# Patient Record
Sex: MALE | Race: WHITE | Employment: FULL TIME | URBAN - METROPOLITAN AREA
[De-identification: names, ages, dates, MRNs, and addresses within clinical notes are randomized per-mention and may not be internally consistent; named-entity substitution may affect disease eponyms.]

---

## 2022-04-28 ENCOUNTER — HOSPITAL ENCOUNTER (EMERGENCY)
Age: 32
Discharge: HOME | End: 2022-04-28
Attending: EMERGENCY MEDICINE
Payer: COMMERCIAL

## 2022-04-28 VITALS
OXYGEN SATURATION: 99 % | DIASTOLIC BLOOD PRESSURE: 101 MMHG | HEIGHT: 76 IN | HEART RATE: 82 BPM | WEIGHT: 285 LBS | BODY MASS INDEX: 34.7 KG/M2 | RESPIRATION RATE: 18 BRPM | TEMPERATURE: 97.7 F | SYSTOLIC BLOOD PRESSURE: 138 MMHG

## 2022-04-28 DIAGNOSIS — T15.91XA FOREIGN BODY OF RIGHT EYE, INITIAL ENCOUNTER: Primary | ICD-10-CM

## 2022-04-28 DIAGNOSIS — S05.01XA ABRASION OF RIGHT CORNEA, INITIAL ENCOUNTER: ICD-10-CM

## 2022-04-28 PROCEDURE — 99283 EMERGENCY DEPT VISIT LOW MDM: CPT

## 2022-04-28 PROCEDURE — 6370000000 HC RX 637 (ALT 250 FOR IP): Performed by: PHYSICIAN ASSISTANT

## 2022-04-28 RX ORDER — ERYTHROMYCIN 5 MG/G
OINTMENT OPHTHALMIC
Qty: 3.5 G | Refills: 0 | Status: SHIPPED | OUTPATIENT
Start: 2022-04-28 | End: 2022-05-08

## 2022-04-28 RX ORDER — TETRACAINE HYDROCHLORIDE 5 MG/ML
SOLUTION OPHTHALMIC
Status: DISCONTINUED
Start: 2022-04-28 | End: 2022-04-28 | Stop reason: HOSPADM

## 2022-04-28 RX ORDER — TETRACAINE HYDROCHLORIDE 5 MG/ML
1 SOLUTION OPHTHALMIC ONCE
Status: COMPLETED | OUTPATIENT
Start: 2022-04-28 | End: 2022-04-28

## 2022-04-28 RX ADMIN — TETRACAINE HYDROCHLORIDE 1 DROP: 5 SOLUTION OPHTHALMIC at 13:50

## 2022-04-28 ASSESSMENT — PAIN DESCRIPTION - LOCATION: LOCATION: EYE

## 2022-04-28 ASSESSMENT — VISUAL ACUITY
OU: 20/25
OS: 20/30
OD: 20/40

## 2022-04-28 ASSESSMENT — PAIN DESCRIPTION - ORIENTATION: ORIENTATION: RIGHT

## 2022-04-28 ASSESSMENT — PAIN SCALES - GENERAL: PAINLEVEL_OUTOF10: 3

## 2022-04-28 ASSESSMENT — PAIN DESCRIPTION - FREQUENCY: FREQUENCY: CONTINUOUS

## 2022-04-28 ASSESSMENT — PAIN DESCRIPTION - DESCRIPTORS: DESCRIPTORS: THROBBING

## 2022-04-28 ASSESSMENT — PAIN DESCRIPTION - PAIN TYPE: TYPE: ACUTE PAIN

## 2022-04-28 NOTE — ED NOTES
2nd Liter of Normal Saline flushed to right eye. Patient notes it \"feels better. \"     Rajesh Peñaloza RN  04/28/22 6733

## 2022-04-28 NOTE — ED NOTES
Patient to the ER with c/c of right injury while at work today. Patient was spreading mortar on a wall and when laying it on the block, the mortar splashed back at face and went under his safety glasses into his right eye. Patient went to urgent care-[facility] as instructed by cortes and they provided drops to eye and told him to go to ER for flushing of eye. Patient is a/o x 3, patent airway, speaking clearly in complete sentences. Patient denies any CP or dyspnea. Lungs are clear and equal bilaterally to auscultation, HT are S/R, A=R. Abdomen is soft, non-tender. No NVD in last 24 hours. Normal Bowel and Bladder habits. Patient has strong PMS x 4. No peripheral edema is appreciated. Patient was ambulatory into the ER today without distress.       Mavis Mcintyre RN  04/28/22 6750

## 2022-04-28 NOTE — ED PROVIDER NOTES
[id number] [facility] ED  [address] [facility] [address]  Phone: [phone]  Fax: [phone]        Pt Name: Jaclynn Cooks  MRN: [id number]  [place] 1990  Date of evaluation: 4/28/22    CHIEFCOMPLAINT       Chief Complaint   Patient presents with    Eye Problem     mortar in right eye       HISTORY OF PRESENT ILLNESS (Location/Symptom, Timing/Onset, Context/Setting, Quality, Duration, Modifying Factors, Severity)      Jaclynn Cooks is a 34 y.o. male with no pertinent PMH who presents to the ED via private auto with eye pain. Patient states that just prior to arrival he was mixing his mortar on the board and a piece that splattered up and got underneath his safety goggles and into his right eye. He reports immediate onset of pain and says he threw off all of his stuff and ran to his truck and flushed out with a bottle of water. Patient reports of persistent pain and irritation to the right eye as well as blurry vision. Denies any exacerbating or alleviating factors. He does not wear contacts. Notes that the more the used did have an accelerant and is slightly alkaline and so was concerned that maybe there was a chemical injury. Denies any vision loss, fever, chills, taking any medications for pain, or any other concerns at this time. PAST MEDICAL / SURGICAL / SOCIAL / FAMILY HISTORY     PMH:  has no past medical history on file. Surgical History:  has no past surgical history on file. Social History:  reports that he has been smoking. He has been smoking about 0.50 packs per day. He has quit using smokeless tobacco. He reports current alcohol use. He reports that he does not use drugs. Family History: has no family status information on file. family history is not on file. Psychiatric History: None    Allergies: Patient has no known allergies. Home Medications:   Prior to Admission medications    Medication Sig Start Date End Date Taking?  Authorizing Provider erythromycin (ROMYCIN) 5 MG/GM ophthalmic ointment Apply 1 0.5 inch ribbon to eye every 4-6 hours x5 days 4/28/22 5/8/22 Yes Helen Nickerson PA-C       REVIEW OF SYSTEMS  (2-9 systems for level 4, 10 ormore for level 5)      Review of Systems    Constitutional: See HPI. Denies fever or chills. Eyes: Positive for eye pain, eye redness, blurry vision. HENT: Positive for rhinorrhea. Denies ear pain or sore throat. Heme: Denies bleeding disorders. All other systems negative except as marked. PHYSICAL EXAM  (up to 7 for level 4, 8 or more for level 5)      INITIAL VITALS:  height is 6' 3.5\" (1.918 m) and weight is 129.3 kg (285 lb). His oral temperature is 97.7 °F (36.5 °C). His blood pressure is 138/101 (abnormal) and his pulse is 82. His respiration is 18 and oxygen saturation is 99%. Vital signs reviewed. Physical Exam    General:  Alert, cooperative, well-groomed, well-nourished, appears stated age, and is in no acute distress. Head:  Normocephalic, atraumatic, and without obvious abnormality. Eyes:  Sclera and conjunctivae are clear without injection, pallor or icterus. No obvious foreign body noted on initial exam. Cornea, anterior chamber, and iris are clear without opacities bilaterally. PERRL EOMI without nystagmus. No enophthalmos or exophthalmos noted. Eye lashes and brows of normal quantity, distribution, color and position without lesions bilaterally. Visual fields intact. ENT: Ears and nose are all without obvious masses lesion or deformity. Clear nasal discharge. No oropharynx examination performed due to aerosolization risk during COVID-19 pandemic. Neck: Supple and symmetrical. Trachea midline. No adenopathy. No jugular venous distention. Extremities: Warm and dry without erythema or edema. Skin: Soft, good turgor, and well-hydrated. No obvious rashes or lesions. Neurologic: GCS is 15 and no focal deficits noted. Normal gait. Speech clear.      DIFFERENTIAL DIAGNOSIS / MDM     Patient presents the emergency department with a foreign body sensation in the eye. Vital signs are unremarkable, mild hypertension. Physical exam demonstrates a well-appearing nontoxic male in no acute distress. Prior to my evaluation, the nurse irrigated the eye with 2 L of saline and patient had improvement in his symptoms, but was still experiencing some discomfort. Patient had significant improvement in the pain after tetracaine. Woods lamp and slit lamp exam demonstrates several small foreign bodies that had the appearance of sand noted to the upper and lower eyelids, but no foreign body within the eye itself, though later in the visit he felt that there was a foreign body and when we looked again, there was a small caroline that I removed with a Q-tip. We then rinsed his face and reflush the eye and he had improvement and I also took a Q-tip and swabbed around all of his eyelashes and eyelids to remove any small pieces of sand/dirt. There is a pinpoint corneal abrasion to the 3 o'clock position just outside of the iris. No signs of orbital cellulitis. He does not wear contacts. Patient was prescribed erythromycin ophthalmic ointment and advised to follow-up with occupational health and then with ophthalmology in 2-3 days if pain does not significantly improve. Patient verbalized understanding. PLAN (LABS / IMAGING / EKG):  No orders of the defined types were placed in this encounter.       MEDICATIONS ORDERED:  Orders Placed This Encounter   Medications    tetracaine (TETRAVISC) 0.5 % ophthalmic solution 1 drop    tetracaine (TETRAVISC) 0.5 % ophthalmic solution     Morris, Vasile: cabinet override    erythromycin (ROMYCIN) 5 MG/GM ophthalmic ointment     Sig: Apply 1 0.5 inch ribbon to eye every 4-6 hours x5 days     Dispense:  3.5 g     Refill:  0       Controlled Substances Monitoring:     DIAGNOSTIC RESULTS     RADIOLOGY: All images are read by the radiologist and their interpretations are reviewed. No results found. LABS:  No results found for this visit on 04/28/22. EMERGENCY DEPARTMENT COURSE           Vitals:    Vitals:    04/28/22 1249   BP: (!) 138/101   Pulse: 82   Resp: 18   Temp: 97.7 °F (36.5 °C)   TempSrc: Oral   SpO2: 99%   Weight: 129.3 kg (285 lb)   Height: 6' 3.5\" (1.918 m)     -------------------------  BP: (!) 138/101, Temp: 97.7 °F (36.5 °C), Pulse: 82, Resp: 18      RE-EVALUATION:  Patient had significant improvement of the pain after the tetracaine drops were placed. Slit lamp exam performed as described below and patient tolerated procedure well. He was given some saline flushes for home as well if any additional flecks get into his eye. The patient and/or family and I have discussed the diagnosis and risks, and we agree with discharging home to follow-up with their primary doctor. The patient appears stable for discharge and has been instructed to return immediately for new concerning symptoms, if the symptoms worsen in any way. We have discussed the symptoms which are most concerning (e.g., fever, changing or worsening pain, persistent vomiting, bloody stools, chest pain, shortness of breath, numbness or weakness to the arms or legs, coolness or color change to the arms or legs) that necessitate immediate return. The patient understands that at this time there is no evidence for a more malignant underlying process, but the patient also understands that early in the process of an illness or injury, an emergency department workup can be falsely reassuring. Routine discharge counseling was given, and the patient understands that worsening, changing or persistent symptoms should prompt an immediate call or follow up with their primary physician or return to the emergency department. The importance of appropriate follow up was also discussed. I have reviewed the disposition diagnosis with the patient and or their family/guardian.  I have answered their questions and given discharge instructions. They voiced understanding of these instructions and did not have any further questions or complaints. The collaborating physician was available for consultation and they were apprised of the assessment and plan. CONSULTS:  None    PROCEDURES:    Slit Lamp Exam Procedure Note    Indication: eye pain    Procedure: The patient was placed in the appropriate position. Anesthesia was obtained using proparacaine drops in the right eye. Fluorescein staining was performed in the right eye and revealed a corneal abrasion of about <1mm at the 3 o'clock position. The slit lamp exam findings were as follows: Right Eye: Upper and Lower lids: a few pinpoint areas of uptake consistent with sand/grit, Conjunctiva: NO chemosis Cornea: No additional area of uptake. The patient tolerated the procedure well. Complications: None    FINAL IMPRESSION      1. Foreign body of right eye, initial encounter    2. Abrasion of right cornea, initial encounter          DISPOSITION / PLAN     CONDITION ON DISPOSITION:   Good / Stable for discharge.      PATIENT REFERRED TO:  Tracy Palomares MD    Call in 2 days  If no improvement      DISCHARGE MEDICATIONS:  New Prescriptions    ERYTHROMYCIN (ROMYCIN) 5 MG/GM OPHTHALMIC OINTMENT    Apply 1 0.5 inch ribbon to eye every 4-6 hours x5 days       Alanis Liriano PA-C   Emergency Medicine Physician Assistant    (Please note that portions of this note were completed with a voice recognition program.  Efforts were made to edit the dictations but occasionally words aremis-transcribed.)        Alanis Liriano PA-C  04/28/22 1912

## 2022-04-28 NOTE — ED PROVIDER NOTES
[address] [facility] ED  [address] [facility] [address]  Phone: [phone]  Fax: [phone]      Attending Physician Attestation    I performed a history and physical examination of the patient and discussed management with the mid level provider. I reviewed the mid level provider's note and agree with the documented findings and plan of care. Any areas of disagreement are noted on the chart. I was personally present for the key portions of any procedures. I have documented in the chart those procedures where I was not present during the key portions. I have reviewed the emergency nurses triage note. I agree with the chief complaint, past medical history, past surgical history, allergies, medications, social and family history as documented unless otherwise noted below. Documentation of the HPI, Physical Exam and Medical Decision Making performed by mid level providers is based on my personal performance of the HPI, PE and MDM. For Physician Assistant/ Nurse Practitioner cases/documentation I have personally evaluated this patient and have completed at least one if not all key elements of the E/M (history, physical exam, and MDM). Additional findings are as noted. CHIEF COMPLAINT       Chief Complaint   Patient presents with    Eye Problem     mortar in right eye         HISTORY OF PRESENT ILLNESS    Crescencio Estes is a 34 y.o. male who presents with some cement particles in the right eye. Denies any loss of vision. He has a feeling of foreign body in the eye. PAST MEDICAL HISTORY    has no past medical history on file. SURGICAL HISTORY      has no past surgical history on file. CURRENT MEDICATIONS       Previous Medications    No medications on file       ALLERGIES     has No Known Allergies. FAMILY HISTORY     has no family status information on file. family history is not on file. SOCIAL HISTORY      reports that he has been smoking.  He has been smoking about 0.50 packs per day. He has quit using smokeless tobacco. He reports current alcohol use. He reports that he does not use drugs. PHYSICAL EXAM     INITIAL VITALS:  height is 6' 3.5\" (1.918 m) and weight is 129.3 kg (285 lb). His oral temperature is 97.7 °F (36.5 °C). His blood pressure is 138/101 (abnormal) and his pulse is 82. His respiration is 18 and oxygen saturation is 99%. Patient was seen and examined by Camille Bergman PA-C. We discussed history physical exam and treatment plan together. I did not physically examine this patient. DIAGNOSTIC RESULTS     EKG: All EKG's are interpreted by the Emergency Department Physician who either signs or Co-signs this chart in the absence of a cardiologist.        RADIOLOGY:   Non-plain film images such as CT, Ultrasound and MRI are read by the radiologist. Plain radiographic images are visualized and the radiologist interpretations are reviewed as follows:         LABS:  No results found for this visit on 04/28/22. EMERGENCY DEPARTMENT COURSE:   Vitals:    Vitals:    04/28/22 1249   BP: (!) 138/101   Pulse: 82   Resp: 18   Temp: 97.7 °F (36.5 °C)   TempSrc: Oral   SpO2: 99%   Weight: 129.3 kg (285 lb)   Height: 6' 3.5\" (1.918 m)     -------------------------  BP: (!) 138/101, Temp: 97.7 °F (36.5 °C), Pulse: 82, Resp: 18      PERTINENT ATTENDING PHYSICIAN COMMENTS:    She has a corneal abrasion. No evidence of burn injury to the eye from the cement. Eye was flushed well with 2 L of saline. (Please note that portions of this note were completed with a voice recognition program.  Efforts were made to edit the dictations but occasionally words are mis-transcribed. )    Rigoberto Sigala DO, , MD, F.A.C.E.P.   Attending Emergency Medicine Physician        Sarahy Ahmadi DO  04/28/22 1134